# Patient Record
Sex: FEMALE | Race: WHITE
[De-identification: names, ages, dates, MRNs, and addresses within clinical notes are randomized per-mention and may not be internally consistent; named-entity substitution may affect disease eponyms.]

---

## 2017-05-25 PROBLEM — Z00.00 ENCOUNTER FOR PREVENTIVE HEALTH EXAMINATION: Status: ACTIVE | Noted: 2017-05-25

## 2017-07-06 ENCOUNTER — APPOINTMENT (OUTPATIENT)
Dept: GERIATRICS | Facility: CLINIC | Age: 72
End: 2017-07-06

## 2023-02-06 ENCOUNTER — INPATIENT (INPATIENT)
Facility: HOSPITAL | Age: 78
LOS: 0 days | Discharge: HOME | DRG: 144 | End: 2023-02-07
Attending: INTERNAL MEDICINE | Admitting: INTERNAL MEDICINE
Payer: MEDICAID

## 2023-02-06 VITALS
DIASTOLIC BLOOD PRESSURE: 67 MMHG | HEART RATE: 83 BPM | SYSTOLIC BLOOD PRESSURE: 130 MMHG | OXYGEN SATURATION: 99 % | RESPIRATION RATE: 18 BRPM | TEMPERATURE: 99 F

## 2023-02-06 DIAGNOSIS — J98.8 OTHER SPECIFIED RESPIRATORY DISORDERS: ICD-10-CM

## 2023-02-06 DIAGNOSIS — E11.9 TYPE 2 DIABETES MELLITUS WITHOUT COMPLICATIONS: ICD-10-CM

## 2023-02-06 DIAGNOSIS — Z20.822 CONTACT WITH AND (SUSPECTED) EXPOSURE TO COVID-19: ICD-10-CM

## 2023-02-06 DIAGNOSIS — B34.9 VIRAL INFECTION, UNSPECIFIED: ICD-10-CM

## 2023-02-06 DIAGNOSIS — R06.02 SHORTNESS OF BREATH: ICD-10-CM

## 2023-02-06 DIAGNOSIS — I10 ESSENTIAL (PRIMARY) HYPERTENSION: ICD-10-CM

## 2023-02-06 LAB
ALBUMIN SERPL ELPH-MCNC: 3.8 G/DL — SIGNIFICANT CHANGE UP (ref 3.5–5.2)
ALP SERPL-CCNC: 86 U/L — SIGNIFICANT CHANGE UP (ref 30–115)
ALT FLD-CCNC: 14 U/L — SIGNIFICANT CHANGE UP (ref 0–41)
ANION GAP SERPL CALC-SCNC: 13 MMOL/L — SIGNIFICANT CHANGE UP (ref 7–14)
AST SERPL-CCNC: 19 U/L — SIGNIFICANT CHANGE UP (ref 0–41)
BASOPHILS # BLD AUTO: 0.06 K/UL — SIGNIFICANT CHANGE UP (ref 0–0.2)
BASOPHILS NFR BLD AUTO: 0.5 % — SIGNIFICANT CHANGE UP (ref 0–1)
BILIRUB SERPL-MCNC: 0.3 MG/DL — SIGNIFICANT CHANGE UP (ref 0.2–1.2)
BUN SERPL-MCNC: 23 MG/DL — HIGH (ref 10–20)
CALCIUM SERPL-MCNC: 8.7 MG/DL — SIGNIFICANT CHANGE UP (ref 8.4–10.5)
CHLORIDE SERPL-SCNC: 99 MMOL/L — SIGNIFICANT CHANGE UP (ref 98–110)
CO2 SERPL-SCNC: 23 MMOL/L — SIGNIFICANT CHANGE UP (ref 17–32)
CREAT SERPL-MCNC: 1.5 MG/DL — SIGNIFICANT CHANGE UP (ref 0.7–1.5)
EGFR: 36 ML/MIN/1.73M2 — LOW
EOSINOPHIL # BLD AUTO: 0.16 K/UL — SIGNIFICANT CHANGE UP (ref 0–0.7)
EOSINOPHIL NFR BLD AUTO: 1.4 % — SIGNIFICANT CHANGE UP (ref 0–8)
FLUAV AG NPH QL: SIGNIFICANT CHANGE UP
FLUBV AG NPH QL: SIGNIFICANT CHANGE UP
GLUCOSE SERPL-MCNC: 132 MG/DL — HIGH (ref 70–99)
HCT VFR BLD CALC: 33.1 % — LOW (ref 37–47)
HGB BLD-MCNC: 11 G/DL — LOW (ref 12–16)
IMM GRANULOCYTES NFR BLD AUTO: 0.4 % — HIGH (ref 0.1–0.3)
LYMPHOCYTES # BLD AUTO: 1.67 K/UL — SIGNIFICANT CHANGE UP (ref 1.2–3.4)
LYMPHOCYTES # BLD AUTO: 14.9 % — LOW (ref 20.5–51.1)
MCHC RBC-ENTMCNC: 28.8 PG — SIGNIFICANT CHANGE UP (ref 27–31)
MCHC RBC-ENTMCNC: 33.2 G/DL — SIGNIFICANT CHANGE UP (ref 32–37)
MCV RBC AUTO: 86.6 FL — SIGNIFICANT CHANGE UP (ref 81–99)
MONOCYTES # BLD AUTO: 1.2 K/UL — HIGH (ref 0.1–0.6)
MONOCYTES NFR BLD AUTO: 10.7 % — HIGH (ref 1.7–9.3)
NEUTROPHILS # BLD AUTO: 8.1 K/UL — HIGH (ref 1.4–6.5)
NEUTROPHILS NFR BLD AUTO: 72.1 % — SIGNIFICANT CHANGE UP (ref 42.2–75.2)
NRBC # BLD: 0 /100 WBCS — SIGNIFICANT CHANGE UP (ref 0–0)
NT-PROBNP SERPL-SCNC: 486 PG/ML — HIGH (ref 0–300)
PLATELET # BLD AUTO: 231 K/UL — SIGNIFICANT CHANGE UP (ref 130–400)
POTASSIUM SERPL-MCNC: 4 MMOL/L — SIGNIFICANT CHANGE UP (ref 3.5–5)
POTASSIUM SERPL-SCNC: 4 MMOL/L — SIGNIFICANT CHANGE UP (ref 3.5–5)
PROT SERPL-MCNC: 6.7 G/DL — SIGNIFICANT CHANGE UP (ref 6–8)
RBC # BLD: 3.82 M/UL — LOW (ref 4.2–5.4)
RBC # FLD: 13.2 % — SIGNIFICANT CHANGE UP (ref 11.5–14.5)
RSV RNA NPH QL NAA+NON-PROBE: SIGNIFICANT CHANGE UP
SARS-COV-2 RNA SPEC QL NAA+PROBE: SIGNIFICANT CHANGE UP
SODIUM SERPL-SCNC: 135 MMOL/L — SIGNIFICANT CHANGE UP (ref 135–146)
TROPONIN T SERPL-MCNC: <0.01 NG/ML — SIGNIFICANT CHANGE UP
WBC # BLD: 11.23 K/UL — HIGH (ref 4.8–10.8)
WBC # FLD AUTO: 11.23 K/UL — HIGH (ref 4.8–10.8)

## 2023-02-06 PROCEDURE — 82962 GLUCOSE BLOOD TEST: CPT

## 2023-02-06 PROCEDURE — 93306 TTE W/DOPPLER COMPLETE: CPT

## 2023-02-06 PROCEDURE — 85027 COMPLETE CBC AUTOMATED: CPT

## 2023-02-06 PROCEDURE — 93005 ELECTROCARDIOGRAM TRACING: CPT

## 2023-02-06 PROCEDURE — 83036 HEMOGLOBIN GLYCOSYLATED A1C: CPT

## 2023-02-06 PROCEDURE — 80048 BASIC METABOLIC PNL TOTAL CA: CPT

## 2023-02-06 PROCEDURE — 86803 HEPATITIS C AB TEST: CPT

## 2023-02-06 PROCEDURE — 71045 X-RAY EXAM CHEST 1 VIEW: CPT | Mod: 26

## 2023-02-06 PROCEDURE — 97162 PT EVAL MOD COMPLEX 30 MIN: CPT | Mod: GP

## 2023-02-06 PROCEDURE — 93010 ELECTROCARDIOGRAM REPORT: CPT

## 2023-02-06 PROCEDURE — 84145 PROCALCITONIN (PCT): CPT

## 2023-02-06 PROCEDURE — 99222 1ST HOSP IP/OBS MODERATE 55: CPT

## 2023-02-06 PROCEDURE — 84484 ASSAY OF TROPONIN QUANT: CPT

## 2023-02-06 RX ORDER — DEXTROSE 50 % IN WATER 50 %
12.5 SYRINGE (ML) INTRAVENOUS ONCE
Refills: 0 | Status: DISCONTINUED | OUTPATIENT
Start: 2023-02-06 | End: 2023-02-07

## 2023-02-06 RX ORDER — FUROSEMIDE 40 MG
20 TABLET ORAL DAILY
Refills: 0 | Status: DISCONTINUED | OUTPATIENT
Start: 2023-02-06 | End: 2023-02-07

## 2023-02-06 RX ORDER — DEXTROSE 50 % IN WATER 50 %
25 SYRINGE (ML) INTRAVENOUS ONCE
Refills: 0 | Status: DISCONTINUED | OUTPATIENT
Start: 2023-02-06 | End: 2023-02-07

## 2023-02-06 RX ORDER — CHLORHEXIDINE GLUCONATE 213 G/1000ML
1 SOLUTION TOPICAL
Refills: 0 | Status: DISCONTINUED | OUTPATIENT
Start: 2023-02-06 | End: 2023-02-07

## 2023-02-06 RX ORDER — HEPARIN SODIUM 5000 [USP'U]/ML
5000 INJECTION INTRAVENOUS; SUBCUTANEOUS EVERY 12 HOURS
Refills: 0 | Status: DISCONTINUED | OUTPATIENT
Start: 2023-02-06 | End: 2023-02-07

## 2023-02-06 RX ORDER — FUROSEMIDE 40 MG
20 TABLET ORAL ONCE
Refills: 0 | Status: COMPLETED | OUTPATIENT
Start: 2023-02-06 | End: 2023-02-06

## 2023-02-06 RX ORDER — DEXTROSE 50 % IN WATER 50 %
15 SYRINGE (ML) INTRAVENOUS ONCE
Refills: 0 | Status: DISCONTINUED | OUTPATIENT
Start: 2023-02-06 | End: 2023-02-07

## 2023-02-06 RX ORDER — GLUCAGON INJECTION, SOLUTION 0.5 MG/.1ML
1 INJECTION, SOLUTION SUBCUTANEOUS ONCE
Refills: 0 | Status: DISCONTINUED | OUTPATIENT
Start: 2023-02-06 | End: 2023-02-07

## 2023-02-06 RX ORDER — GUAIFENESIN/DEXTROMETHORPHAN 600MG-30MG
10 TABLET, EXTENDED RELEASE 12 HR ORAL EVERY 4 HOURS
Refills: 0 | Status: DISCONTINUED | OUTPATIENT
Start: 2023-02-06 | End: 2023-02-07

## 2023-02-06 RX ORDER — ACETAMINOPHEN 500 MG
650 TABLET ORAL EVERY 6 HOURS
Refills: 0 | Status: DISCONTINUED | OUTPATIENT
Start: 2023-02-06 | End: 2023-02-07

## 2023-02-06 RX ORDER — SODIUM CHLORIDE 9 MG/ML
1000 INJECTION, SOLUTION INTRAVENOUS
Refills: 0 | Status: DISCONTINUED | OUTPATIENT
Start: 2023-02-06 | End: 2023-02-07

## 2023-02-06 RX ORDER — INSULIN LISPRO 100/ML
VIAL (ML) SUBCUTANEOUS
Refills: 0 | Status: DISCONTINUED | OUTPATIENT
Start: 2023-02-06 | End: 2023-02-07

## 2023-02-06 RX ADMIN — Medication 20 MILLIGRAM(S): at 21:16

## 2023-02-06 NOTE — H&P ADULT - NS ATTEND AMEND GEN_ALL_CORE FT
Seen            to me there is an infiltrate on right lung field. Will start treatment for CAP Seen while in the ER,  to me there is an infiltrate on right lung field. Will start treatment for CAP (agree with assessment and plan as outlined)

## 2023-02-06 NOTE — ED PROVIDER NOTE - ATTENDING APP SHARED VISIT CONTRIBUTION OF CARE
78-year-old female with a past medical history significant for medical issues diabetes who presents with a cough.  Patient states that for the last 4 days she has been having shortness of breath on exertion with a productive cough.  Patient has not had any cardiac work-up in the past.  Patient denies any other medical complaints.    VITAL SIGNS: I have reviewed nursing notes and confirm.  CONSTITUTIONAL: non-toxic, well appearing  SKIN: no rash, no petechiae.  EYES: EOMI, pink conjunctiva, anicteric  ENT: tongue midline, no exudates, MMM  NECK: Supple; no meningismus, no JVD  CARD: RRR, no murmurs, equal radial pulses bilaterally 2+  RESP: CTAB, no respiratory distress  ABD: Soft, non-tender, non-distended, no peritoneal signs, no HSM, no CVA tenderness  EXT: Normal ROM x4. No edema. No calves tenderness    78 yr old f that presents with a cough, concern for chf onset vs pna. labs, ekg,  imaging. reassess. dispo pending.

## 2023-02-06 NOTE — H&P ADULT - NSHPPHYSICALEXAM_GEN_ALL_CORE
ICU Vital Signs Last 24 Hrs  T(C): 37 (06 Feb 2023 16:58), Max: 37 (06 Feb 2023 16:58)  T(F): 98.6 (06 Feb 2023 16:58), Max: 98.6 (06 Feb 2023 16:58)  HR: 83 (06 Feb 2023 16:58) (83 - 83)  BP: 130/67 (06 Feb 2023 16:58) (130/67 - 130/67)  BP(mean): --  ABP: --  ABP(mean): --  RR: 18 (06 Feb 2023 16:58) (18 - 18)  SpO2: 99% (06 Feb 2023 16:58) (99% - 99%)    O2 Parameters below as of 06 Feb 2023 16:58  Patient On (Oxygen Delivery Method): room air    Constitutional: NAD, well-nourished, non toxic appearing   HEENT: Airway patent, moist MM, no erythema/swelling/deformity of oral structures. EOMI, PERRLA.  CV: regular rate, regular rhythm, well-perfused extremities, 2+ b/l DP and radial pulses equal.  Lungs: BCTA, no increased WOB.  ABD: NTND, no guarding or rebound, no pulsatile mass, no hernias.   MSK: Neck supple, nontender, nl ROM, no stepoff. Chest nontender. Back nontender in TLS spine or to b/l bony structures or flanks. Ext nontender, nl rom, no deformity.   INTEG: Skin warm, dry, no rash.  NEURO: A&Ox3, normal strength, nl sensation throughout, normal speech.   PSYCH: Denies SI/HI/hallucinations

## 2023-02-06 NOTE — H&P ADULT - NSHPLABSRESULTS_GEN_ALL_CORE
11.0   11.23 )-----------( 231      ( 06 Feb 2023 18:25 )             33.1   02-06    135  |  99  |  23<H>  ----------------------------<  132<H>  4.0   |  23  |  1.5    Ca    8.7      06 Feb 2023 18:25    TPro  6.7  /  Alb  3.8  /  TBili  0.3  /  DBili  x   /  AST  19  /  ALT  14  /  AlkPhos  86  02-06

## 2023-02-06 NOTE — H&P ADULT - HISTORY OF PRESENT ILLNESS
pt is a 79 yo F pmhx of HTN, DM presents to ER c/o cough x 4 days. Pt notes productive cough with green sputum and dyspnea on exertion. Pt notes getting short of breath while walking shorter distance. Pt also had episodes of NBNB vomiting x2. + chills, Denies fever, sore throat, chest pain, abdominal pain, diarrhea, dysuria, melena.

## 2023-02-06 NOTE — ED PROVIDER NOTE - CLINICAL SUMMARY MEDICAL DECISION MAKING FREE TEXT BOX
78 yr old f that presents with a cough, concern for chf onset vs pna. labs, ekg,  imaging  Labs and EKG were ordered and reviewed.  Imaging was ordered and reviewed by me.  Appropriate medications for patient's presenting complaints were ordered and effects were reassessed.  Patient's records (prior hospital, ED visit, and/or nursing home notes if available) were reviewed.  Additional history was obtained from EMS, family, and/or PCP (where available).  Escalation to admission/observation was considered.  Patient requires inpatient hospitalization pt admitted to medicine for further evaluation, due to concern for chf onset

## 2023-02-06 NOTE — H&P ADULT - ASSESSMENT
Subjective:      Yvonne Marie Wada is a 85 y.o. female who presents with Arm Pain (R/L arm pain, pt states she has no pain in day time, only pain in the night, on and off for x2 weeks)    Medications:    • atorvastatin  • benazepril  • BIOFLEX PO  • CALCIUM PO  • Cefixime Caps  • CENTRUM SILVER ULTRA WOMENS PO  • fexofenadine Tabs  • fluticasone  • ipratropium Soln  • levothyroxine Tabs  • magnesium gluconate  • metoprolol SR Tb24  • omeprazole  • vitamin D Tabs    Allergies: Amoxicillin; Bactrim [sulfamethoxazole w-trimethoprim]; Codeine; Doxycycline; Trazodone; and Ultram [tramadol]    Problem List: Yvonne Marie Wada has Status post lumbar surgery; Hypertension; Dyslipidemia; S/P TOMY (total abdominal hysterectomy); Preventative health care; S/P total knee arthroplasty; S/P appendectomy; Osteoporosis, idiopathic; Hypothyroid; History of shingles; GERD (gastroesophageal reflux disease); Tricuspid regurgitation; Aortic regurgitation; Cervical pain; S/P thoracic aortic aneurysm repair; CKD (chronic kidney disease) stage 3, GFR 30-59 ml/min (HCC); UTI (urinary tract infection); Insomnia; History shoulder pain; History of transient ischemic attack (TIA); S/P total hip arthroplasty; History of supraventricular tachycardia; Pulmonary hypertension ([facility]); Neutropenia ([facility]); and Allergic rhinitis on their problem list.    Surgical History:  Past Surgical History:   Procedure Laterality Date   • TENDON REPAIR Right 11/10/2016    Procedure: TENDON REPAIR - QUADRACEPS ;  Surgeon: Maxim Herrera M.D.;  Location: [facility];  Service:    • KNEE ARTHROPLASTY TOTAL Right 9/8/2016    Procedure: KNEE ARTHROPLASTY TOTAL;  Surgeon: Maxim Herrera M.D.;  Location: [facility];  Service:    • LUMBAR FUSION POSTERIOR  11/24/2015    Procedure: LUMBAR FUSION POSTERIOR L3-4, EXPLORATION OF FUSION L4-5 WITH INSTRUMENTATION;  Surgeon: Hermann Reis M.D.;  Location: [facility];  Service:    • LUMBAR  LAMINECTOMY DISKECTOMY  11/24/2015    Procedure: LUMBAR L3-4 LAMINECTOMY AND REDO L4-5;  Surgeon: Hermann Reis M.D.;  Location: SURGERY [facility];  Service:    • KNEE ARTHROPLASTY TOTAL  1/3/2012    Performed by YOSEF MEJÍA at [facility]   • KNEE ARTHROSCOPY  10/18/2011    Performed by YOSEF MEJÍA at [facility]   • MEDIAL MENISCECTOMY  10/18/2011    Performed by YOSEF MEJÍA at [facility]   • AORTIC VALVE REPLACEMENT  1/12/2010    Performed by ISAÍAS NICOLE at [facility]   • APPENDECTOMY     • HYSTERECTOMY, TOTAL ABDOMINAL     • LUMBAR FUSION POSTERIOR         Past Social Hx: Yvonne Marie Wada  reports that she has never smoked. She has never used smokeless tobacco. She reports current drug use. She reports that she does not drink alcohol.     Past Family Hx:  Yvonne Marie Wada family history includes Heart Disease in her father and sister; Stroke in her mother.     Problem list, medications, and allergies reviewed by myself today in Epic.         Patient presents with:  Right hand and forearm pain that is tolerable in the daytime, pt states no pain in day time, only pain in the night, on and off for x2 weeks.  Pt states joints in her hands are extremely stiff upon waking in the morning, painful to get them moving even with simple arthritis ROM exercises. Pt has an appointment with rheumatology in September but would like some relief before then.  PT denies recent change in activity, does not garden or marjorie.  Pt states she does read a lot, holds an electronic reader on and off throughout the day, wonders if this is irritating her joints. Pt     Arm Pain    The incident occurred more than 1 week ago. There was no injury mechanism. The pain is present in the right hand, left fingers, left hand, left forearm, right fingers and right forearm. The quality of the pain is described as aching. The pain does not radiate. The pain is at a  "severity of 6/10. The pain is moderate. The pain has been fluctuating since the incident. Pertinent negatives include no chest pain, muscle weakness, numbness or tingling. The symptoms are aggravated by movement. She has tried heat, rest and immobilization for the symptoms. The treatment provided no relief.       Review of Systems   Constitutional: Negative for chills and fever.   Cardiovascular: Negative for chest pain.   Musculoskeletal: Positive for joint pain.   Neurological: Negative for tingling, sensory change, focal weakness and numbness.   All other systems reviewed and are negative.         Objective:     /62   Pulse 71   Temp 36.5 °C (97.7 °F) (Temporal)   Resp 16   Ht 1.626 m (5' 4\")   Wt 70.3 kg (155 lb)   SpO2 95%   BMI 26.61 kg/m²      Physical Exam  Vitals signs and nursing note reviewed.   Constitutional:       General: She is not in acute distress.     Appearance: Normal appearance. She is well-developed. She is not diaphoretic.   HENT:      Head: Normocephalic and atraumatic.      Nose: Nose normal.      Mouth/Throat:      Lips: Pink.      Mouth: Mucous membranes are moist.   Eyes:      Extraocular Movements: Extraocular movements intact.      Conjunctiva/sclera: Conjunctivae normal.      Pupils: Pupils are equal, round, and reactive to light.   Neck:      Musculoskeletal: Normal range of motion and neck supple.   Cardiovascular:      Rate and Rhythm: Normal rate and regular rhythm.      Pulses: Normal pulses.      Heart sounds: Normal heart sounds.   Pulmonary:      Effort: Pulmonary effort is normal.   Abdominal:      General: Abdomen is flat.   Musculoskeletal:      Comments: Bilateral hands are stiff on movements,  is painful, ROM of wrist causes mild pain in forearms.  No erythema, edema or warmth to suggest gout or cellulitis.  Distal neuro/vascular intact.  Cap refill < 2 sec     Skin:     General: Skin is warm and dry.      Capillary Refill: Capillary refill takes less " than 2 seconds.   Neurological:      General: No focal deficit present.      Mental Status: She is alert and oriented to person, place, and time.   Psychiatric:         Mood and Affect: Mood normal.                 Assessment/Plan:     1. Bilateral hand pain  predniSONE (DELTASONE) 20 MG Tab   2. Other osteoarthritis involving multiple joints  predniSONE (DELTASONE) 20 MG Tab     Pt encouraged to prop her electronic reader rather than hold it.     OTC tylenol for pain.     Rx steroids for one week for inflammation.     Continue arthritis exercises to maintain ROM.     PT should follow up with PCP in 1-2 days for re-evaluation if symptoms have not improved.  Discussed red flags and reasons to return to UC or ED.  Pt and/or family verbalized understanding of diagnosis and follow up instructions and was offered informational handout on diagnosis.  PT discharged.         77 yo F pmhx of HTN, DM p/w dyspnea on exertion, cough and mild leukocytosis     # dyspnea on exertion   # new onset CHF v infectious respiratory process   # leukocytosis   - admit to medicine  - monitor on tele  - lasix IV   - echo, ekg   - f/u cardiac enzymes   - cardiology work up   - f/u infectious workup, procal, RVP, repeat CXR    # h/o HTN   - c/w home meds     # h/o DM   - FS AC QHS, ISS, A1C     #diet: dash/cho   # dvt ppx    79 yo F pmhx of HTN, DM p/w dyspnea on exertion, cough and mild leukocytosis     # dyspnea on exertion   # new onset CHF v infectious respiratory process   # leukocytosis   - admit to medicine  - monitor on tele  - lasix IV   - echo, ekg   - f/u cardiac enzymes   - cardiology work up   - f/u infectious workup, procal, RVP, repeat CXR  - i/o, daily weights     # h/o HTN   - c/w home meds     # h/o DM   - FS AC QHS, ISS, A1C     #diet: dash/cho   # dvt ppx

## 2023-02-06 NOTE — ED PROVIDER NOTE - NS ED ROS FT
Constitutional: (-) fever, (+) chills  Eyes: (-) visual changes  ENT: (-) nasal congestion  Cardiovascular: (-) chest pain, (-) syncope  Respiratory: (+) cough, (+) shortness of breath, (+) dyspnea,   Gastrointestinal: (+) vomiting, (-) diarrhea, (+)nausea,  Musculoskeletal: (-) neck pain, (-) back pain, (-) joint pain,  Neurological: (-) headache, (-) loc, (-) dizziness, (-) tingling, (-)numbness,  Peripheral Vascular: (-) leg swelling  :  (-)dysuria,  (-) hematuria  Allergic/Immunologic: (-) pruritus

## 2023-02-06 NOTE — ED PROVIDER NOTE - OBJECTIVE STATEMENT
78 year-old female with past medical history DM, HTN presents with complaint of cough x 4 days. Pt states cough is productive with green phlegm, nonbloody. She also endorses shortness of breath on exertion, decreased appetite, and general weakness. Endorses 2 episodes of vomiting over past 4 days. Denies fever but endorses chills.  Denies CP, pleuritic chest pain, abdominal pain, diarrhea, urinary symptoms.

## 2023-02-06 NOTE — H&P ADULT - NSHPREVIEWOFSYSTEMS_GEN_ALL_CORE
Constitutional: (-) fever (-) chills   Eyes: (-) visual changes  ENMT: (-) nasal or chest congestion (-) runny nose (-) sore throat (-) neck pain (-) neck stiffness  Cardiac: (-) chest pain (-) syncope  Respiratory: (+) cough (+) SOB  GI: (-) nausea (-) vomiting (-) diarrhea  : (-) incontinence  MS:(-) back pain   Neuro: (-) head injury (-) headache (-) dizziness (-) numbness/tingling to extremities B/L (-) LOC   Skin: (-) abrasion (-) rash (-) laceration  Except as documented in the HPI, all other systems are negative.

## 2023-02-06 NOTE — ED PROVIDER NOTE - PHYSICAL EXAMINATION
Physical Exam    Vital Signs: I have reviewed the initial vital signs.  Constitutional: appears stated age, no acute distress  Eyes: Conjunctiva pink, Sclera clear, PERRLA, EOMI.  Cardiovascular: S1 and S2, regular rate, regular rhythm, well-perfused extremities, radial pulses equal and 2+, pedal pulses 2+ and equal  Respiratory: unlabored respiratory effort, clear to auscultation bilaterally   Gastrointestinal: soft, non-tender abdomen  Musculoskeletal: supple neck, no lower extremity edema, no midline tenderness  Integumentary: warm, dry, no rash  Neurologic: awake, alert, oriented x3, extremities’ motor and sensory functions grossly intact  Psychiatric: appropriate mood, appropriate affect

## 2023-02-07 ENCOUNTER — TRANSCRIPTION ENCOUNTER (OUTPATIENT)
Age: 78
End: 2023-02-07

## 2023-02-07 VITALS
SYSTOLIC BLOOD PRESSURE: 138 MMHG | HEART RATE: 86 BPM | OXYGEN SATURATION: 97 % | TEMPERATURE: 98 F | RESPIRATION RATE: 17 BRPM | DIASTOLIC BLOOD PRESSURE: 67 MMHG

## 2023-02-07 LAB
A1C WITH ESTIMATED AVERAGE GLUCOSE RESULT: 6.5 % — HIGH (ref 4–5.6)
ANION GAP SERPL CALC-SCNC: 12 MMOL/L — SIGNIFICANT CHANGE UP (ref 7–14)
BUN SERPL-MCNC: 26 MG/DL — HIGH (ref 10–20)
CALCIUM SERPL-MCNC: 8.8 MG/DL — SIGNIFICANT CHANGE UP (ref 8.4–10.5)
CHLORIDE SERPL-SCNC: 100 MMOL/L — SIGNIFICANT CHANGE UP (ref 98–110)
CO2 SERPL-SCNC: 27 MMOL/L — SIGNIFICANT CHANGE UP (ref 17–32)
CREAT SERPL-MCNC: 1.4 MG/DL — SIGNIFICANT CHANGE UP (ref 0.7–1.5)
EGFR: 39 ML/MIN/1.73M2 — LOW
ESTIMATED AVERAGE GLUCOSE: 140 MG/DL — HIGH (ref 68–114)
GLUCOSE BLDC GLUCOMTR-MCNC: 111 MG/DL — HIGH (ref 70–99)
GLUCOSE BLDC GLUCOMTR-MCNC: 158 MG/DL — HIGH (ref 70–99)
GLUCOSE SERPL-MCNC: 97 MG/DL — SIGNIFICANT CHANGE UP (ref 70–99)
HCT VFR BLD CALC: 32.9 % — LOW (ref 37–47)
HCV AB S/CO SERPL IA: 0.04 COI — SIGNIFICANT CHANGE UP
HCV AB SERPL-IMP: SIGNIFICANT CHANGE UP
HGB BLD-MCNC: 10.8 G/DL — LOW (ref 12–16)
MCHC RBC-ENTMCNC: 28.6 PG — SIGNIFICANT CHANGE UP (ref 27–31)
MCHC RBC-ENTMCNC: 32.8 G/DL — SIGNIFICANT CHANGE UP (ref 32–37)
MCV RBC AUTO: 87 FL — SIGNIFICANT CHANGE UP (ref 81–99)
NRBC # BLD: 0 /100 WBCS — SIGNIFICANT CHANGE UP (ref 0–0)
PLATELET # BLD AUTO: 236 K/UL — SIGNIFICANT CHANGE UP (ref 130–400)
POTASSIUM SERPL-MCNC: 3.9 MMOL/L — SIGNIFICANT CHANGE UP (ref 3.5–5)
POTASSIUM SERPL-SCNC: 3.9 MMOL/L — SIGNIFICANT CHANGE UP (ref 3.5–5)
RBC # BLD: 3.78 M/UL — LOW (ref 4.2–5.4)
RBC # FLD: 13.2 % — SIGNIFICANT CHANGE UP (ref 11.5–14.5)
SODIUM SERPL-SCNC: 139 MMOL/L — SIGNIFICANT CHANGE UP (ref 135–146)
TROPONIN T SERPL-MCNC: <0.01 NG/ML — SIGNIFICANT CHANGE UP
WBC # BLD: 9.86 K/UL — SIGNIFICANT CHANGE UP (ref 4.8–10.8)
WBC # FLD AUTO: 9.86 K/UL — SIGNIFICANT CHANGE UP (ref 4.8–10.8)

## 2023-02-07 PROCEDURE — 99239 HOSP IP/OBS DSCHRG MGMT >30: CPT

## 2023-02-07 PROCEDURE — 93010 ELECTROCARDIOGRAM REPORT: CPT

## 2023-02-07 PROCEDURE — 93306 TTE W/DOPPLER COMPLETE: CPT | Mod: 26

## 2023-02-07 PROCEDURE — 99223 1ST HOSP IP/OBS HIGH 75: CPT

## 2023-02-07 RX ORDER — LOSARTAN POTASSIUM 100 MG/1
100 TABLET, FILM COATED ORAL DAILY
Refills: 0 | Status: DISCONTINUED | OUTPATIENT
Start: 2023-02-07 | End: 2023-02-07

## 2023-02-07 RX ORDER — PANTOPRAZOLE SODIUM 20 MG/1
1 TABLET, DELAYED RELEASE ORAL
Qty: 0 | Refills: 0 | DISCHARGE

## 2023-02-07 RX ORDER — METOPROLOL TARTRATE 50 MG
1 TABLET ORAL
Qty: 0 | Refills: 0 | DISCHARGE

## 2023-02-07 RX ORDER — PANTOPRAZOLE SODIUM 20 MG/1
20 TABLET, DELAYED RELEASE ORAL
Refills: 0 | Status: DISCONTINUED | OUTPATIENT
Start: 2023-02-07 | End: 2023-02-07

## 2023-02-07 RX ORDER — HYDROCHLOROTHIAZIDE 25 MG
1 TABLET ORAL
Qty: 0 | Refills: 0 | DISCHARGE

## 2023-02-07 RX ORDER — ATORVASTATIN CALCIUM 80 MG/1
1 TABLET, FILM COATED ORAL
Qty: 0 | Refills: 0 | DISCHARGE

## 2023-02-07 RX ORDER — METOPROLOL TARTRATE 50 MG
25 TABLET ORAL DAILY
Refills: 0 | Status: DISCONTINUED | OUTPATIENT
Start: 2023-02-07 | End: 2023-02-07

## 2023-02-07 RX ORDER — ATORVASTATIN CALCIUM 80 MG/1
20 TABLET, FILM COATED ORAL AT BEDTIME
Refills: 0 | Status: DISCONTINUED | OUTPATIENT
Start: 2023-02-07 | End: 2023-02-07

## 2023-02-07 RX ORDER — DOCUSATE SODIUM 100 MG
1 CAPSULE ORAL
Qty: 0 | Refills: 0 | DISCHARGE

## 2023-02-07 RX ORDER — METFORMIN HYDROCHLORIDE 850 MG/1
1 TABLET ORAL
Qty: 0 | Refills: 0 | DISCHARGE

## 2023-02-07 RX ORDER — LOSARTAN POTASSIUM 100 MG/1
1 TABLET, FILM COATED ORAL
Qty: 0 | Refills: 0 | DISCHARGE

## 2023-02-07 RX ADMIN — HEPARIN SODIUM 5000 UNIT(S): 5000 INJECTION INTRAVENOUS; SUBCUTANEOUS at 06:23

## 2023-02-07 RX ADMIN — Medication 1: at 11:51

## 2023-02-07 RX ADMIN — Medication 20 MILLIGRAM(S): at 06:24

## 2023-02-07 NOTE — DISCHARGE NOTE PROVIDER - HOSPITAL COURSE
77 yo F pmhx of HTN, DM presents to ER c/o cough x 4 days. Pt notes productive cough with green sputum and dyspnea on exertion. Pt notes getting short of breath while walking shorter distance. Pt also had episodes of NBNB vomiting x2. + chills, Denies fever, sore throat, chest pain, abdominal pain, diarrhea, dysuria, melena.     Patient admitted for ARIAS possible 2/2 viral respiratory infection  Patient afebrile, no sob at rest, no longer has dyspnea on exertion  EKG and trop negative  CXR negative   Leukocytosis resolved   Euvolemic on exam  Evaluated by cardiology and cleared for dc given no cardiac cause  Patient seenand examined on day of dc, on roomair  no complaints  dc home today 77 yo F pmhx of HTN, DM presents to ER c/o cough x 4 days. Pt notes productive cough with green sputum and dyspnea on exertion. Pt notes getting short of breath while walking shorter distance. Pt also had episodes of NBNB vomiting x2. + chills, Denies fever, sore throat, chest pain, abdominal pain, diarrhea, dysuria, melena.     Patient admitted for ARIAS possible 2/2 viral respiratory infection  Patient afebrile, no sob at rest, no longer has dyspnea on exertion  EKG and trop negative  CXR negative   Leukocytosis resolved   Euvolemic on exam  Evaluated by cardiology and cleared for dc given no cardiac cause  Patient seenand examined on day of dc, on roomair  no complaints  dc home today - ambulated well

## 2023-02-07 NOTE — DISCHARGE NOTE PROVIDER - NSDCCPCAREPLAN_GEN_ALL_CORE_FT
PRINCIPAL DISCHARGE DIAGNOSIS  Diagnosis: Shortness of breath  Assessment and Plan of Treatment: you had shortness of breath 2.2 respiratory virus      SECONDARY DISCHARGE DIAGNOSES  Diagnosis: DM (diabetes mellitus)  Assessment and Plan of Treatment:      PRINCIPAL DISCHARGE DIAGNOSIS  Diagnosis: Shortness of breath  Assessment and Plan of Treatment: you had shortness of breath 2.2 respiratory virus  ekg and troponin negative  TTE unremarkable except mild valvular insufficiency   The patient was evaluated by cardiology and was deemed not to have a cardiological cause and recommended possible outpatient stress test      SECONDARY DISCHARGE DIAGNOSES  Diagnosis: DM (diabetes mellitus)  Assessment and Plan of Treatment:  home medications

## 2023-02-07 NOTE — DISCHARGE NOTE NURSING/CASE MANAGEMENT/SOCIAL WORK - PATIENT PORTAL LINK FT
You can access the FollowMyHealth Patient Portal offered by [facility] by registering at the following website: [email or web address]. By joining [employer]’s FollowMyHealth portal, you will also be able to view your health information using other applications (apps) compatible with our system.

## 2023-02-07 NOTE — ED ADULT NURSE REASSESSMENT NOTE - NS ED NURSE REASSESS COMMENT FT1
Patient in bed, voiced no concern, NAD noted. Patient encouraged to make needs known. Daughter at bedside

## 2023-02-07 NOTE — PHYSICAL THERAPY INITIAL EVALUATION ADULT - PATIENT/FAMILY/SIGNIFICANT OTHER GOALS STATEMENT, PT EVAL
Per son, patient would like to be able to get up and walk without getting out of breath so she can go home

## 2023-02-07 NOTE — CONSULT NOTE ADULT - ASSESSMENT
77 yo F pmhx of HTN, DM presented to the ED with URI symptoms x4 days. Also endorses chest discomfort while in the ED      Impression:  #Chest pain: ACS ruled out  #HTN/DM    CP appears atypical  and currently denies CP or SOB.   ECG: Sinus, non ischemic  Trop flat x2  Cxr: Unremarkable  On exam euvolemic and pBNP 486      Plan:  TTE pending  Cont w/home meds  Outpatient f/u with cardiology for possible stress test and further workup  Monitor lyluz    Discussed with Dr parr

## 2023-02-07 NOTE — DISCHARGE NOTE PROVIDER - CARE PROVIDER_API CALL
Adonis Parks (DO)  Cardiovascular Disease; Internal Medicine  [address] [place] [id number]  Phone: [phone]  Fax: [phone]  Follow Up Time:

## 2023-02-07 NOTE — PHYSICAL THERAPY INITIAL EVALUATION ADULT - ADDITIONAL COMMENTS
Per patient's son whom patient preferred to translate [place] as needed, they live in private home with no steps to get in or inside home, was not using any assistive device

## 2023-02-07 NOTE — DISCHARGE NOTE NURSING/CASE MANAGEMENT/SOCIAL WORK - NSDCPEFALRISK_GEN_ALL_CORE
For information on Fall & Injury Prevention, visit: [email or web address] OR  [email or web address] OR  https://www.cdc.gov/steadi/patient.html

## 2023-02-07 NOTE — CONSULT NOTE ADULT - NS ATTEND AMEND GEN_ALL_CORE FT
Patient seen and examined. Pertinent labs, imaging and telemetry reviewed. I agree with the above:     Patient currently without CP. Daughter at bedside to assist with translation. She started having URI symptoms last 4-5 days. She had atypical CP, associated with cough. She denies CP, SOB/ARIAS, palpitations, LE edema. Has never followed with cardiology.   -EKG nonischemic. Troponin negative x2  TTE with normal EF and minimal valvular regurgitation.   -Symptoms seem consistent with respiratory virus. Doubt cardiac etiology.   -Can follow with cardiology as outpatient.     Discussed with patient, daughter and ACP.

## 2023-02-07 NOTE — CONSULT NOTE ADULT - SUBJECTIVE AND OBJECTIVE BOX
HPI:  pt is a 77 yo F pmhx of HTN, DM presents to ER c/o cough x 4 days. Pt notes productive cough with green sputum and dyspnea on exertion. Pt notes getting short of breath while walking shorter distance. Pt also had episodes of NBNB vomiting x2. + chills, Denies fever, sore throat, chest pain, abdominal pain, diarrhea, dysuria, melena.    (06 Feb 2023 21:11)        HPI-Cardiology   Pt with the above Hx, evaluated at bedside with Dr Parks. Pt is [place] speaking and translation done by Pt's daughter at bedside. Pt presented with URI symptoms going on 4 days. Pt also endorses she had some chest discomfort for a few minutes and points to the mid-epigastric and upper and area b/l. Pain worse when coughing per Pt. Pt denies any Hx of cardiac disease, and currently denies any CP, SOB, orthopnea, palpitations, dizziness/lightheadedness, or nausea/vomiting. Radiology tests and hospital records, were reviewed, as well as previous notes on this patient.      PAST MEDICAL & SURGICAL HISTORY  HTN (hypertension)    DM (diabetes mellitus)          ALLERGIES:  No Known Allergies      MEDICATIONS:  MEDICATIONS  (STANDING):  chlorhexidine 2% Cloths 1 Application(s) Topical <User Schedule>  dextrose 5%. 1000 milliLiter(s) (100 mL/Hr) IV Continuous <Continuous>  dextrose 5%. 1000 milliLiter(s) (50 mL/Hr) IV Continuous <Continuous>  dextrose 50% Injectable 25 Gram(s) IV Push once  dextrose 50% Injectable 12.5 Gram(s) IV Push once  dextrose 50% Injectable 25 Gram(s) IV Push once  furosemide    Tablet 20 milliGRAM(s) Oral daily  glucagon  Injectable 1 milliGRAM(s) IntraMuscular once  heparin   Injectable 5000 Unit(s) SubCutaneous every 12 hours  insulin lispro (ADMELOG) corrective regimen sliding scale   SubCutaneous three times a day before meals    MEDICATIONS  (PRN):  acetaminophen     Tablet .. 650 milliGRAM(s) Oral every 6 hours PRN Temp greater or equal to 38C (100.4F), Mild Pain (1 - 3)  dextrose Oral Gel 15 Gram(s) Oral once PRN Blood Glucose LESS THAN 70 milliGRAM(s)/deciliter  guaifenesin/dextromethorphan Oral Liquid 10 milliLiter(s) Oral every 4 hours PRN Cough      HOME MEDICATIONS:  Home Medications:      VITALS:   T(F): 97.9 (02-07 @ 05:13), Max: 98.6 (02-06 @ 16:58)  HR: 86 (02-07 @ 05:13) (62 - 86)  BP: 138/67 (02-07 @ 05:13) (125/70 - 138/67)  BP(mean): --  RR: 17 (02-07 @ 05:13) (17 - 18)  SpO2: 97% (02-07 @ 05:13) (97% - 99%)    I&O's Summary      REVIEW OF SYSTEMS:  See HPI      PHYSICAL EXAM:  NEURO: patient is awake , alert and oriented  GEN: Not in acute distress  NECK: no thyroid enlargement, no JVD  LUNGS: Clear to auscultation bilaterally   CARDIOVASCULAR: S1/S2 present, RRR , no murmurs or rubs, no carotid bruits,  + PP bilaterally  ABD: Soft, non-tender, non-distended, +BS  EXT: No LONNIE  SKIN: Intact      LABS:                        10.8   9.86  )-----------( 236      ( 07 Feb 2023 09:24 )             32.9     02-07    139  |  100  |  26<H>  ----------------------------<  97  3.9   |  27  |  1.4    Ca    8.8      07 Feb 2023 09:24    TPro  6.7  /  Alb  3.8  /  TBili  0.3  /  DBili  x   /  AST  19  /  ALT  14  /  AlkPhos  86  02-06      Troponin T, Serum: <0.01 ng/mL (02-07-23 @ 09:24)  Troponin T, Serum: <0.01 ng/mL (02-06-23 @ 18:25)    CARDIAC MARKERS ( 07 Feb 2023 09:24 )  x     / <0.01 ng/mL / x     / x     / x      CARDIAC MARKERS ( 06 Feb 2023 18:25 )  x     / <0.01 ng/mL / x     / x     / x          Serum Pro-Brain Natriuretic Peptide: 486 pg/mL (02-06-23 @ 18:25)          RADIOLOGY:  -CXR:  < from: Xray Chest 1 View- PORTABLE-Urgent (02.06.23 @ 18:41) >  Impression:    No radiographic evidence of acute cardiopulmonary disease.        --- End of Report ---    < end of copied text >

## 2023-02-07 NOTE — PHYSICAL THERAPY INITIAL EVALUATION ADULT - PERTINENT HX OF CURRENT PROBLEM, REHAB EVAL
79 y/o female with diagnoses of SOB, Type II DM without complications with cough, SOB, decreased appetite, and generalized weakness at home

## 2023-02-07 NOTE — PHYSICAL THERAPY INITIAL EVALUATION ADULT - GENERAL OBSERVATIONS, REHAB EVAL
14:25-14:50 Chart reviewed. Pt encountered semireclined in bed, may be seen by Physical Therapist as confirmed with Nurse. Patient denied pain at rest  and ready to walk so she cna go home; +tele/heplock

## 2023-02-07 NOTE — DISCHARGE NOTE PROVIDER - NSDCMRMEDTOKEN_GEN_ALL_CORE_FT
atorvastatin 20 mg oral tablet: 1 tab(s) orally once a day  docusate sodium 100 mg oral tablet: 1 tab(s) orally 3 times a day, As Needed  hydroCHLOROthiazide 25 mg oral tablet: 1 tab(s) orally once a day  losartan 100 mg oral tablet: 1 tab(s) orally once a day  metFORMIN 500 mg oral tablet: 1 tab(s) orally once a day  metoprolol succinate 25 mg oral tablet, extended release: 1 tab(s) orally once a day  pantoprazole 20 mg oral delayed release tablet: 1 tab(s) orally once a day

## 2023-02-07 NOTE — PHYSICAL THERAPY INITIAL EVALUATION ADULT - GAIT DEVIATIONS NOTED, PT EVAL
guarded posture, dec arm swing/decreased roberto/decreased step length/decreased weight-shifting ability

## 2023-02-08 LAB — PROCALCITONIN SERPL-MCNC: 0.05 NG/ML — SIGNIFICANT CHANGE UP (ref 0.02–0.1)
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
